# Patient Record
(demographics unavailable — no encounter records)

---

## 2018-07-13 NOTE — MMO
BILATERAL SCREENING MAMMOGRAM:

 

Date:  07/13/18 

 

HISTORY:  

Benign biopsy in the left breast in 2013. 47-year-old female. Routine screening mammography. 

 

COMPARISON:  

11/01/13, 10/24/13, 05/10/12. 

 

TECHNIQUE:  

CC and MLO views of both breasts are submitted for interpretation. 

 

This patient's mammogram was reviewed with the assistance of computer-aided detection.  

 

FINDINGS:

The breasts are composed of heterogeneously dense fibroglandular tissue, which limits the sensitivity
 of mammography in the detection of underlying malignancy. 

 

In the right breast, there is no suspicious dominant mass, architectural distortion, or suspicious ca
lcifications. 

 

In the left breast, there are two biopsy clips in the upper outer and mid outer quadrants. 

 

IMPRESSION: 

BIRADS 2:  Benign Finding(s)

 

RECOMMENDATION:

Annual mammogram.  

 

POS: [facility]

## 2021-01-21 NOTE — NM
Exam: Nuclear medicine HIDA scan



HISTORY: Epigastric pain.



COMPARISON: None



TECHNIQUE: Patient was administered 5.03 mCi of technetium 99m mebrofenin intravenously. Initial imag
ing was carried out for 60 minutes. Patient was subsequently given morphine and additional imaging

was carried out for a total of 90 minutes.



FINDINGS: Expected distribution of the radiotracer. There is localization of radiotracer in the intra
hepatic biliary system. Passage of radiotracer from the common bile duct into small bowel loops. No

evidence of radiotracer localization on the first 60 minute images. Patient was administration was mi
nistered morphine. There is no evidence of radiotracer localization on the post morphine images.



IMPRESSION:

Scintigraphic evidence of cholecystitis given the lack of radiotracer localization in the gallbladder
.

Findings conveyed to Dr. Morales 1/21/2021 at 1:29 PM

Code CR



Transcribed Date/Time: 1/21/2021 1:43 PM



Reported By: Estephania Hicks 

Electronically Signed:  1/21/2021 2:16 PM